# Patient Record
Sex: MALE | Race: WHITE | URBAN - METROPOLITAN AREA
[De-identification: names, ages, dates, MRNs, and addresses within clinical notes are randomized per-mention and may not be internally consistent; named-entity substitution may affect disease eponyms.]

---

## 2023-09-20 PROBLEM — R26.89 BALANCE PROBLEM: Status: ACTIVE | Noted: 2023-09-20

## 2023-09-20 PROBLEM — F51.01 PRIMARY INSOMNIA: Status: ACTIVE | Noted: 2023-09-20

## 2023-09-20 PROBLEM — R25.1 TREMOR OF BOTH HANDS: Status: ACTIVE | Noted: 2023-09-20

## 2023-09-20 PROBLEM — E78.2 MIXED HYPERLIPIDEMIA: Status: ACTIVE | Noted: 2023-09-20

## 2023-09-20 PROBLEM — S86.112D: Status: ACTIVE | Noted: 2023-09-20

## 2023-09-20 PROBLEM — R25.1 SHAKINESS: Status: ACTIVE | Noted: 2023-09-20

## 2023-09-20 PROBLEM — S76.312D HAMSTRING MUSCLE STRAIN, LEFT, SUBSEQUENT ENCOUNTER: Status: ACTIVE | Noted: 2023-09-20

## 2023-09-20 PROBLEM — I10 ESSENTIAL HYPERTENSION: Status: ACTIVE | Noted: 2023-09-20

## 2023-09-20 PROBLEM — E56.9 VITAMIN DEFICIENCY: Status: ACTIVE | Noted: 2023-09-20

## 2023-09-20 PROBLEM — G47.30 SLEEP APNEA: Status: ACTIVE | Noted: 2023-09-20

## 2023-09-20 PROBLEM — M51.9 INTERVERTEBRAL DISC DISORDER: Status: ACTIVE | Noted: 2023-09-20

## 2023-09-20 PROBLEM — F32.A DEPRESSION: Status: ACTIVE | Noted: 2023-09-20

## 2023-09-20 PROBLEM — F41.8 SITUATIONAL ANXIETY: Status: ACTIVE | Noted: 2023-09-20

## 2023-09-20 PROBLEM — S83.249A TEAR OF MEDIAL MENISCUS OF KNEE: Status: ACTIVE | Noted: 2023-09-20

## 2023-09-20 PROBLEM — M1A.0291: Status: ACTIVE | Noted: 2023-09-20

## 2023-09-20 PROBLEM — R61 SWEATING PROFUSELY: Status: ACTIVE | Noted: 2023-09-20

## 2023-09-20 PROBLEM — Z04.9 CONDITION NOT FOUND: Status: ACTIVE | Noted: 2023-09-20

## 2023-09-20 PROBLEM — R26.81 UNSTEADY GAIT: Status: ACTIVE | Noted: 2023-09-20

## 2023-09-20 PROBLEM — I73.9 CLAUDICATION (CMS-HCC): Status: ACTIVE | Noted: 2023-09-20

## 2023-09-20 PROBLEM — S83.411A SPRAIN OF MEDIAL COLLATERAL LIGAMENT OF RIGHT KNEE: Status: ACTIVE | Noted: 2023-09-20

## 2023-09-20 PROBLEM — R41.840 ATTENTION DEFICIT: Status: ACTIVE | Noted: 2023-09-20

## 2023-09-20 PROBLEM — R55 NEAR SYNCOPE: Status: ACTIVE | Noted: 2023-09-20

## 2023-09-20 RX ORDER — MULTIVITAMIN WITH IRON
1 TABLET ORAL DAILY
COMMUNITY
Start: 2022-07-01

## 2023-09-20 RX ORDER — MELOXICAM 15 MG/1
15 TABLET ORAL DAILY
COMMUNITY
Start: 2022-11-03 | End: 2024-04-17 | Stop reason: SDUPTHER

## 2023-09-20 RX ORDER — HYDROCHLOROTHIAZIDE 25 MG/1
25 TABLET ORAL DAILY
COMMUNITY
Start: 2020-10-27 | End: 2024-04-04 | Stop reason: SDUPTHER

## 2023-09-20 RX ORDER — SERTRALINE HYDROCHLORIDE 100 MG/1
2 TABLET, FILM COATED ORAL DAILY
COMMUNITY
Start: 2022-03-09 | End: 2024-04-08 | Stop reason: WASHOUT

## 2023-09-20 RX ORDER — TRAZODONE HYDROCHLORIDE 100 MG/1
100 TABLET ORAL NIGHTLY
COMMUNITY
Start: 2022-11-03 | End: 2024-04-08 | Stop reason: ALTCHOICE

## 2023-09-20 RX ORDER — SOD SULF/POT CHLORIDE/MAG SULF 1.479 G
TABLET ORAL
COMMUNITY
Start: 2022-06-28 | End: 2024-04-08 | Stop reason: WASHOUT

## 2023-09-20 RX ORDER — ATORVASTATIN CALCIUM 20 MG/1
1 TABLET, FILM COATED ORAL NIGHTLY
COMMUNITY
Start: 2022-03-18 | End: 2024-04-08 | Stop reason: WASHOUT

## 2023-09-20 RX ORDER — FLUTICASONE PROPIONATE 50 MCG
1 SPRAY, SUSPENSION (ML) NASAL DAILY
COMMUNITY
Start: 2022-06-21 | End: 2024-04-08 | Stop reason: SDUPTHER

## 2023-09-20 RX ORDER — SERTRALINE HYDROCHLORIDE 200 MG/1
1 CAPSULE ORAL NIGHTLY
COMMUNITY
Start: 2022-02-11 | End: 2024-04-08 | Stop reason: SDUPTHER

## 2023-09-20 RX ORDER — ATORVASTATIN CALCIUM 40 MG/1
40 TABLET, FILM COATED ORAL NIGHTLY
COMMUNITY
Start: 2022-03-18 | End: 2024-04-08 | Stop reason: SDUPTHER

## 2023-10-02 ENCOUNTER — PROCEDURE VISIT (OUTPATIENT)
Dept: SLEEP MEDICINE | Facility: HOSPITAL | Age: 58
End: 2023-10-02
Payer: COMMERCIAL

## 2023-10-02 DIAGNOSIS — G47.33 OBSTRUCTIVE SLEEP APNEA (ADULT) (PEDIATRIC): ICD-10-CM

## 2023-10-02 DIAGNOSIS — G47.61 PERIODIC LIMB MOVEMENT DISORDER: ICD-10-CM

## 2023-10-02 PROCEDURE — 95811 POLYSOM 6/>YRS CPAP 4/> PARM: CPT | Performed by: INTERNAL MEDICINE

## 2023-10-11 ENCOUNTER — OFFICE VISIT (OUTPATIENT)
Dept: SLEEP MEDICINE | Facility: CLINIC | Age: 58
End: 2023-10-11
Payer: COMMERCIAL

## 2023-10-11 VITALS — HEART RATE: 86 BPM | WEIGHT: 230 LBS | BODY MASS INDEX: 34.86 KG/M2 | HEIGHT: 68 IN | RESPIRATION RATE: 19 BRPM

## 2023-10-11 VITALS
HEIGHT: 68 IN | HEART RATE: 111 BPM | WEIGHT: 230 LBS | DIASTOLIC BLOOD PRESSURE: 86 MMHG | SYSTOLIC BLOOD PRESSURE: 122 MMHG | BODY MASS INDEX: 34.86 KG/M2

## 2023-10-11 DIAGNOSIS — G47.33 OBSTRUCTIVE SLEEP APNEA SYNDROME: Primary | ICD-10-CM

## 2023-10-11 PROCEDURE — 1036F TOBACCO NON-USER: CPT | Performed by: INTERNAL MEDICINE

## 2023-10-11 PROCEDURE — 3074F SYST BP LT 130 MM HG: CPT | Performed by: INTERNAL MEDICINE

## 2023-10-11 PROCEDURE — 99214 OFFICE O/P EST MOD 30 MIN: CPT | Performed by: INTERNAL MEDICINE

## 2023-10-11 PROCEDURE — 3079F DIAST BP 80-89 MM HG: CPT | Performed by: INTERNAL MEDICINE

## 2023-10-11 ASSESSMENT — SLEEP AND FATIGUE QUESTIONNAIRES
HOW LIKELY ARE YOU TO NOD OFF OR FALL ASLEEP WHILE SITTING AND READING: HIGH CHANCE OF DOZING
HOW LIKELY ARE YOU TO NOD OFF OR FALL ASLEEP WHILE LYING DOWN TO REST IN THE AFTERNOON WHEN CIRCUMSTANCES PERMIT: HIGH CHANCE OF DOZING
HOW LIKELY ARE YOU TO NOD OFF OR FALL ASLEEP WHILE SITTING AND TALKING TO SOMEONE: MODERATE CHANCE OF DOZING
HOW LIKELY ARE YOU TO NOD OFF OR FALL ASLEEP WHILE SITTING QUIETLY AFTER LUNCH WITHOUT ALCOHOL: HIGH CHANCE OF DOZING
ESS-CHAD TOTAL SCORE: 18
SITING INACTIVE IN A PUBLIC PLACE LIKE A CLASS ROOM OR A MOVIE THEATER: SLIGHT CHANCE OF DOZING
HOW LIKELY ARE YOU TO NOD OFF OR FALL ASLEEP IN A CAR, WHILE STOPPED FOR A FEW MINUTES IN TRAFFIC: WOULD NEVER DOZE
HOW LIKELY ARE YOU TO NOD OFF OR FALL ASLEEP WHILE WATCHING TV: HIGH CHANCE OF DOZING
HOW LIKELY ARE YOU TO NOD OFF OR FALL ASLEEP WHEN YOU ARE A PASSENGER IN A CAR FOR AN HOUR WITHOUT A BREAK: HIGH CHANCE OF DOZING

## 2023-10-11 ASSESSMENT — PAIN SCALES - GENERAL: PAINLEVEL: 0-NO PAIN

## 2023-10-11 NOTE — PROGRESS NOTES
Subjective   Patient ID: Krishna Pressley is a 57 y.o. male who presents for Follow-up and Review Sleep Study Results.  HPI  10/2/2023: Split-night sleep study, RDI 3% 154, RDI 4% 139.9, MAY 0.6, SPO2 nato 83%, CPAP 13 cm H2O with heated humidification and ResMed Quatro medium fullface mask.  PLMI 21.7    Review of Systems   All other systems reviewed and are negative.      Objective   Physical Exam  PHYSICAL EXAM: GENERAL: alert pleasant and cooperative no acute distress  nasal mucosa , normal, and high arched palate narrow  MODIFIED MONTANO SCORE: IV  MODIFIED MALLAMPATI SCORE: IV (only hard palate visible)  edematous  1+  TONGUE SCALLOPING: enlarged     Assessment/Plan   Problem List Items Addressed This Visit             ICD-10-CM    Obstructive sleep apnea syndrome - Primary G47.33     OBSTRUCTIVE SLEEP APNEA:  - The patient has sleep apnea and requires treatment.  - Start  CPAP 13 cm H20 through [employer].  - Sleep apnea and PAP therapy education was provided at length in clinic today. Krishna  verbalized understanding.  - Diet, exercise, and weight loss were emphasized today in clinic, as were non-supine sleep, avoiding alcohol in the late evening, and driving or operating heavy machinery when sleepy.   -Krishna verbalized understanding.          Relevant Orders    Positive Airway Pressure (PAP) Therapy

## 2023-10-11 NOTE — ASSESSMENT & PLAN NOTE
OBSTRUCTIVE SLEEP APNEA:  - The patient has sleep apnea and requires treatment.  - Start  CPAP 13 cm H20 through [facility]  - Sleep apnea and PAP therapy education was provided at length in clinic today. Krishna  verbalized understanding.  - Diet, exercise, and weight loss were emphasized today in clinic, as were non-supine sleep, avoiding alcohol in the late evening, and driving or operating heavy machinery when sleepy.   -Krishna verbalized understanding.

## 2023-10-11 NOTE — PATIENT INSTRUCTIONS
"  Starting Positive Airway Pressure: You were ordered a device to wear when you sleep called PAP (Positive Airway Pressure) to treat your sleep apnea. The order will be submitted to a durable medical equipment company who will arrange setting you up with the device. They will provide all the necessary equipment and discuss use and maintenance of the device with you.     Please followup with us in 1-2 months of starting PAP to see how well it is working for you or to troubleshoot. Please bring your equipment to this initial followup visit.    **Please bring all PAP equipment with you to follow up appointments unless told otherwise.**     Important things to keep in mind as you start PAP:  Insurance will monitor your usage during the first 90 days.  You should use your PAP - \"all night, every night\", for your health. The bare minimum is to use your PAP device while sleeping = at least 4 hours per day at least 5 days per week. Otherwise, your PAP device may be reclaimed by your PAP vendor at 90 days.  There are many mask to choose from to wear with your PAP machine. If you are not comfortable with the first mask issued to you, call your DME and ask for another option to try. Some have a 30 day return policy.  Discuss with your provider if you are having issues breathing with the machine or the temperature or humidity feel uncomfortable  Expect to have an adjustment period when you start your device. It helps to continuing wearing the machine every day for a period of time until you get more used to it. You can practice with wearing the mask alone if you need, then add in the PAP air pressure a few days later.   Reach out for help if you are struggling! The sleep medicine department can be reached at [phone]  We encourage you to download data monitoring apps to your phone. For [employer] 10/11 - MyAir isai. For POI - DreamMapper. Both are available in the Isai store for free and are a great " tool to monitor your progress with your CPAP night to night.

## 2023-10-12 NOTE — PROGRESS NOTES
[facility] TECH NOTE:     Patient: Krishna Pressley   MRN//AGE: [id number]  1965  57 y.o.   Technologist: Bk Buckley   Room: 1   Service Date: 10/11/2023        Sleep Testing Location: [facility]: 13    TECHNOLOGIST SLEEP STUDY PROCEDURE NOTE:   This sleep study is being conducted according to the policies and procedures outlined by the AAS accreditation standards.  The sleep study procedure and processes involved during this appointment was explained to the patient/patient’s family, questions were answered. The patient/family verbalized understanding.      The patient is a 57 y.o. year old male scheduled for aDiagnostic PSG Split night with montage of: PSG  He arrived for his appointment.      The study that was ultimately completed was a Diagnostic PSG Split night with montage of: PSG    The full study was completed.  Patient questionnaires completed?: yes     Consents signed? yes    Initial Fall Risk Screening:     Krishna has not fallen in the last 6 months. His did not result in injury. Krishna does not have a fear of falling. He does not need assistance with sitting, standing, or walking. He does not need assistance walking in his home. He does not need assistance in an unfamiliar setting. The patient is notusing an assistive device.     Brief Study observations: The patient was referred for a split night study. Respiratory events, desaturations and arousals were observed. After two hours of sleep, the patient qualified to be split. The final pressure was 13cm       Deviation to order/protocol and reason: Split night       If PAP, which was preferred mask/pressure/mode: Res Med Quattro medium full face mask       Other:None    After the procedure, the patient/family was informed to ensure followup with ordering clinician for testing results.      Technologist: Bk Buckley [facility]

## 2023-12-26 ENCOUNTER — TELEPHONE (OUTPATIENT)
Dept: RHEUMATOLOGY | Facility: CLINIC | Age: 58
End: 2023-12-26
Payer: COMMERCIAL

## 2023-12-26 DIAGNOSIS — M45.9 ANKYLOSING SPONDYLITIS, UNSPECIFIED SITE OF SPINE (MULTI): ICD-10-CM

## 2023-12-26 DIAGNOSIS — R76.8 POSITIVE ANA (ANTINUCLEAR ANTIBODY): ICD-10-CM

## 2023-12-26 DIAGNOSIS — E79.0 HYPERURICEMIA: ICD-10-CM

## 2023-12-26 DIAGNOSIS — D69.3 CHRONIC IDIOPATHIC THROMBOCYTOPENIC PURPURA (MULTI): ICD-10-CM

## 2023-12-26 DIAGNOSIS — M06.09 RHEUMATOID ARTHRITIS OF MULTIPLE SITES WITHOUT RHEUMATOID FACTOR (MULTI): ICD-10-CM

## 2023-12-26 DIAGNOSIS — M06.09 POLYARTHRITIS WITH NEGATIVE RHEUMATOID FACTOR (MULTI): ICD-10-CM

## 2023-12-26 DIAGNOSIS — E56.9 VITAMIN DEFICIENCY: ICD-10-CM

## 2023-12-26 DIAGNOSIS — Z11.59 ENCOUNTER FOR SCREENING FOR VIRAL DISEASE: ICD-10-CM

## 2024-01-10 ENCOUNTER — APPOINTMENT (OUTPATIENT)
Dept: RHEUMATOLOGY | Facility: CLINIC | Age: 59
End: 2024-01-10
Payer: COMMERCIAL

## 2024-03-13 ENCOUNTER — APPOINTMENT (OUTPATIENT)
Dept: RHEUMATOLOGY | Facility: CLINIC | Age: 59
End: 2024-03-13
Payer: COMMERCIAL

## 2024-04-04 DIAGNOSIS — I10 ESSENTIAL HYPERTENSION: ICD-10-CM

## 2024-04-04 RX ORDER — HYDROCHLOROTHIAZIDE 25 MG/1
25 TABLET ORAL DAILY
Qty: 30 TABLET | Refills: 0 | Status: SHIPPED | OUTPATIENT
Start: 2024-04-04 | End: 2024-04-08 | Stop reason: SDUPTHER

## 2024-04-08 ENCOUNTER — OFFICE VISIT (OUTPATIENT)
Dept: PRIMARY CARE | Facility: CLINIC | Age: 59
End: 2024-04-08
Payer: COMMERCIAL

## 2024-04-08 ENCOUNTER — LAB (OUTPATIENT)
Dept: LAB | Facility: LAB | Age: 59
End: 2024-04-08
Payer: COMMERCIAL

## 2024-04-08 VITALS
TEMPERATURE: 97.4 F | SYSTOLIC BLOOD PRESSURE: 132 MMHG | HEIGHT: 68 IN | WEIGHT: 237 LBS | HEART RATE: 88 BPM | DIASTOLIC BLOOD PRESSURE: 90 MMHG | BODY MASS INDEX: 35.92 KG/M2 | RESPIRATION RATE: 18 BRPM | OXYGEN SATURATION: 96 %

## 2024-04-08 DIAGNOSIS — F41.9 ANXIETY: ICD-10-CM

## 2024-04-08 DIAGNOSIS — Z00.00 ANNUAL PHYSICAL EXAM: Primary | ICD-10-CM

## 2024-04-08 DIAGNOSIS — M62.08 DIASTASIS RECTI: ICD-10-CM

## 2024-04-08 DIAGNOSIS — J30.2 SEASONAL ALLERGIES: ICD-10-CM

## 2024-04-08 DIAGNOSIS — I73.9 CLAUDICATION (CMS-HCC): ICD-10-CM

## 2024-04-08 DIAGNOSIS — Z12.5 SCREENING PSA (PROSTATE SPECIFIC ANTIGEN): ICD-10-CM

## 2024-04-08 DIAGNOSIS — Z00.00 ANNUAL PHYSICAL EXAM: ICD-10-CM

## 2024-04-08 DIAGNOSIS — I10 ESSENTIAL HYPERTENSION: ICD-10-CM

## 2024-04-08 LAB
ALBUMIN SERPL-MCNC: 4.5 G/DL (ref 3.5–5)
ALP BLD-CCNC: 98 U/L (ref 35–125)
ALT SERPL-CCNC: 30 U/L (ref 5–40)
ANION GAP SERPL CALC-SCNC: 13 MMOL/L
AST SERPL-CCNC: 20 U/L (ref 5–40)
BILIRUB SERPL-MCNC: 1 MG/DL (ref 0.1–1.2)
BUN SERPL-MCNC: 18 MG/DL (ref 8–25)
CALCIUM SERPL-MCNC: 9.7 MG/DL (ref 8.5–10.4)
CHLORIDE SERPL-SCNC: 102 MMOL/L (ref 97–107)
CHOLEST SERPL-MCNC: 247 MG/DL (ref 133–200)
CHOLEST/HDLC SERPL: 4.5 {RATIO}
CO2 SERPL-SCNC: 23 MMOL/L (ref 24–31)
CREAT SERPL-MCNC: 0.9 MG/DL (ref 0.4–1.6)
EGFRCR SERPLBLD CKD-EPI 2021: >90 ML/MIN/1.73M*2
GLUCOSE SERPL-MCNC: 98 MG/DL (ref 65–99)
HDLC SERPL-MCNC: 55 MG/DL
LDLC SERPL CALC-MCNC: 150 MG/DL (ref 65–130)
POTASSIUM SERPL-SCNC: 4.2 MMOL/L (ref 3.4–5.1)
PROT SERPL-MCNC: 6.9 G/DL (ref 5.9–7.9)
PSA SERPL-MCNC: 0.7 NG/ML
SODIUM SERPL-SCNC: 138 MMOL/L (ref 133–145)
TRIGL SERPL-MCNC: 210 MG/DL (ref 40–150)

## 2024-04-08 PROCEDURE — 1036F TOBACCO NON-USER: CPT | Performed by: NURSE PRACTITIONER

## 2024-04-08 PROCEDURE — 3075F SYST BP GE 130 - 139MM HG: CPT | Performed by: NURSE PRACTITIONER

## 2024-04-08 PROCEDURE — 84153 ASSAY OF PSA TOTAL: CPT

## 2024-04-08 PROCEDURE — 80053 COMPREHEN METABOLIC PANEL: CPT

## 2024-04-08 PROCEDURE — 80061 LIPID PANEL: CPT

## 2024-04-08 PROCEDURE — 36415 COLL VENOUS BLD VENIPUNCTURE: CPT

## 2024-04-08 PROCEDURE — 3080F DIAST BP >= 90 MM HG: CPT | Performed by: NURSE PRACTITIONER

## 2024-04-08 PROCEDURE — 99396 PREV VISIT EST AGE 40-64: CPT | Performed by: NURSE PRACTITIONER

## 2024-04-08 RX ORDER — FLUTICASONE PROPIONATE 50 MCG
1 SPRAY, SUSPENSION (ML) NASAL DAILY
Qty: 16 G | Refills: 5 | Status: SHIPPED | OUTPATIENT
Start: 2024-04-08

## 2024-04-08 RX ORDER — SERTRALINE HYDROCHLORIDE 200 MG/1
1 CAPSULE ORAL NIGHTLY
Qty: 90 CAPSULE | Refills: 1 | Status: SHIPPED | OUTPATIENT
Start: 2024-04-08 | End: 2024-04-11

## 2024-04-08 RX ORDER — FLUTICASONE PROPIONATE 50 MCG
SPRAY, SUSPENSION (ML) NASAL
Qty: 48 G | OUTPATIENT
Start: 2024-04-08

## 2024-04-08 RX ORDER — ATORVASTATIN CALCIUM 40 MG/1
40 TABLET, FILM COATED ORAL NIGHTLY
Qty: 90 TABLET | Refills: 1 | Status: SHIPPED | OUTPATIENT
Start: 2024-04-08 | End: 2024-10-05

## 2024-04-08 RX ORDER — HYDROCHLOROTHIAZIDE 25 MG/1
25 TABLET ORAL DAILY
Qty: 90 TABLET | OUTPATIENT
Start: 2024-04-08

## 2024-04-08 RX ORDER — HYDROCHLOROTHIAZIDE 25 MG/1
25 TABLET ORAL DAILY
Qty: 30 TABLET | Refills: 0 | Status: SHIPPED | OUTPATIENT
Start: 2024-04-08 | End: 2024-04-08 | Stop reason: SDUPTHER

## 2024-04-08 RX ORDER — HYDROCHLOROTHIAZIDE 25 MG/1
25 TABLET ORAL DAILY
Qty: 90 TABLET | Refills: 1 | Status: SHIPPED | OUTPATIENT
Start: 2024-04-08 | End: 2024-10-05

## 2024-04-08 ASSESSMENT — PATIENT HEALTH QUESTIONNAIRE - PHQ9
2. FEELING DOWN, DEPRESSED OR HOPELESS: NOT AT ALL
1. LITTLE INTEREST OR PLEASURE IN DOING THINGS: NOT AT ALL
SUM OF ALL RESPONSES TO PHQ9 QUESTIONS 1 AND 2: 0

## 2024-04-08 ASSESSMENT — ENCOUNTER SYMPTOMS
NAUSEA: 0
DIZZINESS: 0
FEVER: 0
WHEEZING: 0
LIGHT-HEADEDNESS: 0
HEMATOLOGIC/LYMPHATIC NEGATIVE: 1
WEAKNESS: 0
MUSCULOSKELETAL NEGATIVE: 1
COUGH: 0
NUMBNESS: 0
VOMITING: 0
SHORTNESS OF BREATH: 0
PSYCHIATRIC NEGATIVE: 1
DIAPHORESIS: 0
CONSTIPATION: 0
PALPITATIONS: 0
ALLERGIC/IMMUNOLOGIC NEGATIVE: 1
FATIGUE: 0
DIARRHEA: 0

## 2024-04-08 ASSESSMENT — PAIN SCALES - GENERAL: PAINLEVEL: 7

## 2024-04-08 NOTE — PROGRESS NOTES
"History Of Present Illness  Krishna Pressley is a 58 y.o. male presenting for \"Annual Exam (States he had a colonoscopy a year ago).\"    HPI 58 year old male here for a physical and labs.  He is using a CPAP works well for him wearing eight hours a day.  Needs refill on flonase.      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    Intervertebral disc disorder 09/20/2023    Mixed hyperlipidemia 09/20/2023    Near syncope 09/20/2023    Primary insomnia 09/20/2023    Shakiness 09/20/2023    Obstructive sleep apnea syndrome 09/20/2023    Sprain of medial collateral ligament of right knee 09/20/2023    Tear of medial meniscus of knee 09/20/2023    Sweating profusely 09/20/2023    Tremor of both hands 09/20/2023    Unsteady gait 09/20/2023    Vitamin deficiency 09/20/2023    Condition not found 09/20/2023        Medications  Current Outpatient Medications   Medication Instructions    atorvastatin (LIPITOR) 40 mg, oral, Nightly    fluticasone (Flonase) 50 mcg/actuation nasal spray 1 spray, Each Nostril, Daily    hydroCHLOROthiazide (HYDRODIURIL) 25 mg, oral, Daily    meloxicam (MOBIC) 15 mg, oral, Daily    multivitamin (Multiple Vitamins) tablet 1 tablet, oral, Daily    sertraline 200 mg capsule 1 capsule, oral, Nightly        Surgical History  He has a past surgical history that includes Vasectomy (02/11/2014).     Social History  He reports that he has quit smoking. His smoking use included cigarettes. He has been exposed to tobacco smoke. He has quit using smokeless tobacco. He reports that he does not " currently use alcohol. He reports that he does not use drugs.    Family History  Family History   Problem Relation Name Age of Onset    Other (non hodgkins lymphoma) Mother      Hypertension Mother      Cancer Father      Hypertension Father          Allergies  Other, Meperidine, Oxycodone-acetaminophen, and Pentazocine    ROS  Review of Systems   Constitutional:  Negative for diaphoresis, fatigue and fever.   HENT: Negative.     Respiratory:  Negative for cough, shortness of breath and wheezing.    Cardiovascular:  Negative for chest pain and palpitations.   Gastrointestinal:  Negative for constipation, diarrhea, nausea and vomiting.   Genitourinary: Negative.    Musculoskeletal: Negative.    Skin: Negative.    Allergic/Immunologic: Negative.    Neurological:  Negative for dizziness, weakness, light-headedness and numbness.   Hematological: Negative.    Psychiatric/Behavioral: Negative.          Last Recorded Vitals  /90 (BP Location: Right arm, Patient Position: Sitting, BP Cuff Size: Adult)   Pulse 88   Temp 36.3 °C (97.4 °F)   Resp 18   Wt 108 kg (237 lb)   SpO2 96%     Physical Exam  Vitals and nursing note reviewed.   Constitutional:       Appearance: Normal appearance.   HENT:      Head: Normocephalic and atraumatic.      Right Ear: Tympanic membrane, ear canal and external ear normal.      Left Ear: Tympanic membrane, ear canal and external ear normal.      Nose: Nose normal.      Mouth/Throat:      Mouth: Mucous membranes are moist.      Pharynx: Oropharynx is clear.   Eyes:      Extraocular Movements: Extraocular movements intact.      Conjunctiva/sclera: Conjunctivae normal.      Pupils: Pupils are equal, round, and reactive to light.   Neck:      Thyroid: No thyromegaly.   Cardiovascular:      Rate and Rhythm: Normal rate and regular rhythm.      Pulses: Normal pulses.      Heart sounds: Normal heart sounds, S1 normal and S2 normal.   Pulmonary:      Effort: Pulmonary effort is normal.       Breath sounds: Normal breath sounds. No wheezing or rhonchi.   Abdominal:      General: Bowel sounds are normal.      Palpations: Abdomen is soft. There is no mass.      Tenderness: There is no abdominal tenderness. There is no guarding.   Genitourinary:     Comments: Not examined  Musculoskeletal:         General: Normal range of motion.      Cervical back: Normal range of motion.      Right lower leg: No edema.      Left lower leg: No edema.   Lymphadenopathy:      Cervical: No cervical adenopathy.   Skin:     General: Skin is warm and dry.      Capillary Refill: Capillary refill takes less than 2 seconds.      Findings: No rash.   Neurological:      General: No focal deficit present.      Mental Status: He is alert and oriented to person, place, and time. Mental status is at baseline.      Cranial Nerves: Cranial nerves 2-12 are intact. No cranial nerve deficit.      Sensory: Sensation is intact.      Motor: Motor function is intact.      Coordination: Coordination is intact.      Gait: Gait is intact.   Psychiatric:         Mood and Affect: Mood normal.         Behavior: Behavior normal.         Thought Content: Thought content normal.         Judgment: Judgment normal.         Relevant Results      Assessment/Plan   Krishna was seen today for annual exam.  Diagnoses and all orders for this visit:  Annual physical exam (Primary)  -     Comprehensive Metabolic Panel; Future  -     Lipid Panel; Future  Diastasis recti  -     Referral to General Surgery; Future  Screening PSA (prostate specific antigen)  -     Prostate Specific Antigen; Future  Claudication (CMS/HCC)  Comments:  stable  Essential hypertension  -     Discontinue: hydroCHLOROthiazide (HYDRODiuril) 25 mg tablet; Take 1 tablet (25 mg) by mouth once daily.  -     atorvastatin (Lipitor) 40 mg tablet; Take 1 tablet (40 mg) by mouth once daily at bedtime.  -     hydroCHLOROthiazide (HYDRODiuril) 25 mg tablet; Take 1 tablet (25 mg) by mouth once  daily.  Anxiety  -     sertraline 200 mg capsule; Take 1 capsule by mouth once daily at bedtime.  Seasonal allergies  -     fluticasone (Flonase) 50 mcg/actuation nasal spray; Administer 1 spray into each nostril once daily.          COUNSELING      Medication education:              Education:  The patient is counseled regarding potential side-effects of any and all new medications             Understanding:  Patient expressed understanding             Adherence:  No barriers to adherence identified        Ashley Bahena, APRN-CNP

## 2024-04-09 PROBLEM — R41.840 IMPAIRED CONCENTRATION: Status: ACTIVE | Noted: 2022-06-30

## 2024-04-09 PROBLEM — M25.569 KNEE PAIN: Status: ACTIVE | Noted: 2024-04-09

## 2024-04-09 PROBLEM — M45.9 ANKYLOSING SPONDYLITIS (MULTI): Status: ACTIVE | Noted: 2024-04-09

## 2024-04-09 PROBLEM — G47.61 PERIODIC LIMB MOVEMENT DISORDER: Status: ACTIVE | Noted: 2024-04-09

## 2024-04-09 PROBLEM — U09.9 POST COVID-19 CONDITION, UNSPECIFIED: Status: ACTIVE | Noted: 2024-04-09

## 2024-04-09 PROBLEM — M54.12 CERVICAL RADICULOPATHY: Status: ACTIVE | Noted: 2024-04-09

## 2024-04-09 PROBLEM — R61 EXCESSIVE SWEATING: Status: ACTIVE | Noted: 2022-03-23

## 2024-04-09 PROBLEM — E55.9 VITAMIN D DEFICIENCY: Status: ACTIVE | Noted: 2023-09-20

## 2024-04-09 PROBLEM — R29.6 RECURRENT FALLS: Status: ACTIVE | Noted: 2022-03-23

## 2024-04-09 PROBLEM — M62.08 DIASTASIS OF RECTUS ABDOMINIS: Status: ACTIVE | Noted: 2024-04-09

## 2024-04-09 PROBLEM — R69 DISORDER: Status: ACTIVE | Noted: 2024-04-09

## 2024-04-09 PROBLEM — M25.59 PAIN IN OTHER SPECIFIED JOINT: Status: ACTIVE | Noted: 2024-04-09

## 2024-04-09 PROBLEM — M54.2 NECK PAIN: Status: ACTIVE | Noted: 2024-04-09

## 2024-04-09 PROBLEM — R26.9 ABNORMAL GAIT: Status: ACTIVE | Noted: 2022-03-23

## 2024-04-09 PROBLEM — G47.30 SLEEP APNEA: Status: ACTIVE | Noted: 2024-04-09

## 2024-04-09 PROBLEM — R19.7 DIARRHEA, UNSPECIFIED: Status: ACTIVE | Noted: 2022-07-11

## 2024-04-09 PROBLEM — R05.9 COUGH: Status: ACTIVE | Noted: 2021-12-29

## 2024-04-09 PROBLEM — I10 PRIMARY HYPERTENSION: Status: ACTIVE | Noted: 2022-11-03

## 2024-04-09 PROBLEM — J30.2 SEASONAL ALLERGIES: Status: ACTIVE | Noted: 2024-04-09

## 2024-04-09 PROBLEM — M10.9 GOUTY ARTHRITIS OF TOE: Status: ACTIVE | Noted: 2023-09-20

## 2024-04-10 ENCOUNTER — OFFICE VISIT (OUTPATIENT)
Dept: SURGERY | Facility: CLINIC | Age: 59
End: 2024-04-10
Payer: COMMERCIAL

## 2024-04-10 VITALS
HEIGHT: 68 IN | TEMPERATURE: 97.4 F | OXYGEN SATURATION: 95 % | SYSTOLIC BLOOD PRESSURE: 120 MMHG | HEART RATE: 84 BPM | WEIGHT: 240 LBS | BODY MASS INDEX: 36.37 KG/M2 | DIASTOLIC BLOOD PRESSURE: 84 MMHG

## 2024-04-10 DIAGNOSIS — M62.08 DIASTASIS RECTI: ICD-10-CM

## 2024-04-10 DIAGNOSIS — K43.2 RECURRENT VENTRAL HERNIA: Primary | ICD-10-CM

## 2024-04-10 PROCEDURE — 1036F TOBACCO NON-USER: CPT | Performed by: SURGERY

## 2024-04-10 PROCEDURE — 99213 OFFICE O/P EST LOW 20 MIN: CPT | Performed by: SURGERY

## 2024-04-10 PROCEDURE — 3079F DIAST BP 80-89 MM HG: CPT | Performed by: SURGERY

## 2024-04-10 PROCEDURE — 3074F SYST BP LT 130 MM HG: CPT | Performed by: SURGERY

## 2024-04-10 PROCEDURE — 99203 OFFICE O/P NEW LOW 30 MIN: CPT | Performed by: SURGERY

## 2024-04-10 ASSESSMENT — ENCOUNTER SYMPTOMS
BRUISES/BLEEDS EASILY: 0
WOUND: 0
CONSTIPATION: 0
ABDOMINAL PAIN: 0
FEVER: 0
BLOOD IN STOOL: 0
SHORTNESS OF BREATH: 0
NECK PAIN: 0
TROUBLE SWALLOWING: 0
CHEST TIGHTNESS: 0
NAUSEA: 0
WEAKNESS: 0
FACIAL SWELLING: 0
SPEECH DIFFICULTY: 0
COLOR CHANGE: 0
NERVOUS/ANXIOUS: 0
BACK PAIN: 0
DIFFICULTY URINATING: 0
CHILLS: 0
VOMITING: 0
SORE THROAT: 0
LIGHT-HEADEDNESS: 0
ABDOMINAL DISTENTION: 0
ADENOPATHY: 0
EYE REDNESS: 0
DIARRHEA: 0
CONFUSION: 0

## 2024-04-10 ASSESSMENT — PATIENT HEALTH QUESTIONNAIRE - PHQ9
1. LITTLE INTEREST OR PLEASURE IN DOING THINGS: NOT AT ALL
SUM OF ALL RESPONSES TO PHQ9 QUESTIONS 1 AND 2: 0
2. FEELING DOWN, DEPRESSED OR HOPELESS: NOT AT ALL

## 2024-04-10 ASSESSMENT — PAIN SCALES - GENERAL: PAINLEVEL: 0-NO PAIN

## 2024-04-10 NOTE — PROGRESS NOTES
General Surgery Service    History Of Present Illness    Krishna Pressley is a 58 y.o. male presenting with an asymptomatic bulge above his umbilicus.  He had a previous open hernia repair in 2015.  He had gained weight since then, and is now working on losing weight.  He is lost 12-15 pounds intentionally.  He denies obstructive symptoms.  He denies pain.    He has a diagnosis of ankylosing spondylitis on his past medical history, but he denies having any back pain.  He rides a motorcycle without difficulty.     Past Medical History  He has a past medical history of Anxiety and High blood pressure.    Current Outpatient Medications on File Prior to Visit   Medication Sig Dispense Refill    atorvastatin (Lipitor) 40 mg tablet Take 1 tablet (40 mg) by mouth once daily at bedtime. 90 tablet 1    fluticasone (Flonase) 50 mcg/actuation nasal spray Administer 1 spray into each nostril once daily. 16 g 5    hydroCHLOROthiazide (HYDRODiuril) 25 mg tablet Take 1 tablet (25 mg) by mouth once daily. 90 tablet 1    meloxicam (Mobic) 15 mg tablet Take 1 tablet (15 mg) by mouth once daily.      multivitamin (Multiple Vitamins) tablet Take 1 tablet by mouth once daily.      sertraline 200 mg capsule Take 1 capsule by mouth once daily at bedtime. 90 capsule 1    [DISCONTINUED] atorvastatin (Lipitor) 20 mg tablet Take 1 tablet (20 mg) by mouth once daily at bedtime.      [DISCONTINUED] atorvastatin (Lipitor) 40 mg tablet Take 1 tablet (40 mg) by mouth once daily at bedtime.      [DISCONTINUED] fluticasone (Flonase) 50 mcg/actuation nasal spray Administer 1 spray into each nostril once daily.      [DISCONTINUED] hydroCHLOROthiazide (HYDRODiuril) 25 mg tablet Take 1 tablet (25 mg) by mouth once daily.      [DISCONTINUED] hydroCHLOROthiazide (HYDRODiuril) 25 mg tablet Take 1 tablet (25 mg) by mouth once daily. 30 tablet 0    [DISCONTINUED] hydroCHLOROthiazide (HYDRODiuril) 25 mg tablet Take 1 tablet (25 mg) by mouth once daily. 30  tablet 0    [DISCONTINUED] sertraline (Zoloft) 100 mg tablet Take 2 tablets (200 mg) by mouth once daily.      [DISCONTINUED] sertraline 200 mg capsule Take 1 capsule by mouth once daily at bedtime.      [DISCONTINUED] sod sulf-pot chloride-mag sulf (Sutab) 1.479-0.188- 0.225 gram tablet Sutab 4745-156-128 MG Oral Tablet   Quantity: 24  Refills: 0        Start : 28-Jun-2022   Active      [DISCONTINUED] traZODone (Desyrel) 100 mg tablet Take 1 tablet (100 mg) by mouth once daily at bedtime.       No current facility-administered medications on file prior to visit.         Surgical History  He has a past surgical history that includes Vasectomy (02/11/2014).     Social History  He reports that he has quit smoking. His smoking use included cigarettes. He has been exposed to tobacco smoke. He has quit using smokeless tobacco. He reports that he does not currently use alcohol. He reports that he does not use drugs.    Family History  Family History   Problem Relation Name Age of Onset    Other (non hodgkins lymphoma) Mother      Hypertension Mother      Cancer Father      Hypertension Father          Allergies  Other, Meperidine, Oxycodone-acetaminophen, and Pentazocine    Review of Systems   Constitutional:  Negative for chills and fever.   HENT:  Negative for facial swelling, sore throat and trouble swallowing.    Eyes:  Negative for redness and visual disturbance.   Respiratory:  Negative for chest tightness and shortness of breath.    Cardiovascular:  Negative for chest pain and leg swelling.   Gastrointestinal:  Negative for abdominal distention, abdominal pain, blood in stool, constipation, diarrhea, nausea and vomiting.   Endocrine: Negative for cold intolerance and heat intolerance.   Genitourinary:  Negative for difficulty urinating and enuresis.   Musculoskeletal:  Negative for back pain, gait problem and neck pain.   Skin:  Negative for color change, rash and wound.   Allergic/Immunologic: Negative for  immunocompromised state.   Neurological:  Negative for speech difficulty, weakness and light-headedness.   Hematological:  Negative for adenopathy. Does not bruise/bleed easily.   Psychiatric/Behavioral:  Negative for confusion. The patient is not nervous/anxious.         Physical Exam  Constitutional:       General: He is not in acute distress.     Appearance: He is not toxic-appearing.   HENT:      Head: Normocephalic and atraumatic.      Mouth/Throat:      Mouth: Mucous membranes are moist.      Pharynx: Oropharynx is clear.   Eyes:      General: No scleral icterus.     Extraocular Movements: Extraocular movements intact.      Pupils: Pupils are equal, round, and reactive to light.   Cardiovascular:      Rate and Rhythm: Normal rate and regular rhythm.   Pulmonary:      Effort: Pulmonary effort is normal.      Breath sounds: Normal breath sounds.   Abdominal:      General: There is no distension.      Palpations: Abdomen is soft. There is no mass.      Tenderness: There is no abdominal tenderness. There is no guarding.      Hernia: No hernia (Diastases recti approximately 6 cm wide extending from the umbilicus to the xiphoid.  He has a small recurrent supraumbilical ventral hernia measuring about 2 cm in size.  Reducible, nontender.) is present.   Musculoskeletal:         General: No swelling. Normal range of motion.      Cervical back: Normal range of motion.   Skin:     General: Skin is warm and dry.      Coloration: Skin is not jaundiced.   Neurological:      General: No focal deficit present.      Mental Status: He is alert and oriented to person, place, and time.   Psychiatric:         Mood and Affect: Mood normal.         Behavior: Behavior normal.          Last Recorded Vitals  /84 (BP Location: Left arm, Patient Position: Sitting)   Pulse 84   Temp 36.3 °C (97.4 °F)   Wt 109 kg (240 lb)   SpO2 95%     Relevant Results      No results found for this or any previous visit (from the past 24  hour(s)).   Lab Results   Component Value Date    HGBA1C 5.2 02/21/2018      No results found.    Active Problems:  There are no active Hospital Problems.     Assessment/Plan   Problem List Items Addressed This Visit             ICD-10-CM    Diastasis recti M62.08     Explained to the patient the diastases by itself is a cosmetic condition and that it does not carry risk of incarceration or obstruction.  Gave the patient a list of exercises to perform in conjunction with weight loss.  He states that he stopped drinking and is cutting carbs and is already lost about 15 pounds.  Encouraged continued weight loss and exercises to close down the diastases prior to consideration of repeat hernia repair.         Recurrent ventral hernia - Primary K43.2     Patient has a small recurrence on the superior aspect of the previous hernia repair arising within diastases recti.  His BMI is 36, which she says he has been losing weight.  Risks, benefits, and alternatives to hernia repair were discussed. Risks of bleeding, infection, mesh complications, chronic pain, potential need for bowel resection or fistulization,defunctionalization of the abdominal wall, skin necrosis, and recurrence were all discussed. Optimization of weight management and other risk factors for these complications was discussed. Benefits of laparoscopic and open approaches were discussed. We discussed the importance of mesh, in that it can reduce recurrence significantly compared to non-mesh repairs.  We discussed the option of non-operative management and the overall potential risk of incarceration.  The patient agrees to proceed with observation and continued weight loss.                   Thomas Garcia MD

## 2024-04-10 NOTE — ASSESSMENT & PLAN NOTE
Explained to the patient the diastases by itself is a cosmetic condition and that it does not carry risk of incarceration or obstruction.  Gave the patient a list of exercises to perform in conjunction with weight loss.  He states that he stopped drinking and is cutting carbs and is already lost about 15 pounds.  Encouraged continued weight loss and exercises to close down the diastases prior to consideration of repeat hernia repair.  
Patient has a small recurrence on the superior aspect of the previous hernia repair arising within diastases recti.  His BMI is 36, which she says he has been losing weight.  Risks, benefits, and alternatives to hernia repair were discussed. Risks of bleeding, infection, mesh complications, chronic pain, potential need for bowel resection or fistulization,defunctionalization of the abdominal wall, skin necrosis, and recurrence were all discussed. Optimization of weight management and other risk factors for these complications was discussed. Benefits of laparoscopic and open approaches were discussed. We discussed the importance of mesh, in that it can reduce recurrence significantly compared to non-mesh repairs.  We discussed the option of non-operative management and the overall potential risk of incarceration.  The patient agrees to proceed with observation and continued weight loss.  
Robotic assisted laparoscopic gastric bypass

## 2024-04-11 DIAGNOSIS — F41.8 SITUATIONAL ANXIETY: Primary | ICD-10-CM

## 2024-04-11 DIAGNOSIS — Z76.0 MEDICATION REFILL: ICD-10-CM

## 2024-04-11 RX ORDER — SERTRALINE HYDROCHLORIDE 100 MG/1
200 TABLET, FILM COATED ORAL DAILY
Qty: 180 TABLET | Refills: 1 | Status: SHIPPED | OUTPATIENT
Start: 2024-04-11 | End: 2024-10-08

## 2024-04-11 NOTE — TELEPHONE ENCOUNTER
Rx request received  Pharmacy populated  Last appmt 4/08/24 AND HAS UPCOMING APPMT 10/08/24.     MIKE LINDQUIST DID NOT POPULATE THIS SINCE THE RX CHANGE IS REQUESTING A DIFFERENT FORM

## 2024-04-11 NOTE — TELEPHONE ENCOUNTER
CARYL BUENROSTRO REQUESTING IF SERTRALINE 100 MG TABLETS TO TAKE 2 TO EQUAL SAME DOSE SENT  [facility] [employer]SUL[facility] AND COP[facility] IS $225 IF THEY DISPENSE THE CAPSULES REQUESTING CHANGE

## 2024-04-17 RX ORDER — MELOXICAM 15 MG/1
15 TABLET ORAL DAILY
Qty: 90 TABLET | Refills: 1 | Status: SHIPPED | OUTPATIENT
Start: 2024-04-17

## 2024-10-08 ENCOUNTER — OFFICE VISIT (OUTPATIENT)
Dept: PRIMARY CARE | Facility: CLINIC | Age: 59
End: 2024-10-08
Payer: COMMERCIAL

## 2024-10-08 ENCOUNTER — TELEPHONE (OUTPATIENT)
Dept: SLEEP MEDICINE | Facility: CLINIC | Age: 59
End: 2024-10-08

## 2024-10-08 VITALS
HEIGHT: 68 IN | TEMPERATURE: 96.5 F | RESPIRATION RATE: 18 BRPM | WEIGHT: 229 LBS | HEART RATE: 62 BPM | BODY MASS INDEX: 34.71 KG/M2 | DIASTOLIC BLOOD PRESSURE: 84 MMHG | OXYGEN SATURATION: 97 % | SYSTOLIC BLOOD PRESSURE: 142 MMHG

## 2024-10-08 DIAGNOSIS — M45.9 ANKYLOSING SPONDYLITIS, UNSPECIFIED SITE OF SPINE (MULTI): ICD-10-CM

## 2024-10-08 DIAGNOSIS — E78.2 MIXED HYPERLIPIDEMIA: ICD-10-CM

## 2024-10-08 DIAGNOSIS — Z23 ENCOUNTER FOR IMMUNIZATION: ICD-10-CM

## 2024-10-08 DIAGNOSIS — I10 ESSENTIAL HYPERTENSION: Primary | ICD-10-CM

## 2024-10-08 DIAGNOSIS — F41.8 SITUATIONAL ANXIETY: ICD-10-CM

## 2024-10-08 PROCEDURE — 3079F DIAST BP 80-89 MM HG: CPT | Performed by: NURSE PRACTITIONER

## 2024-10-08 PROCEDURE — 90656 IIV3 VACC NO PRSV 0.5 ML IM: CPT | Performed by: NURSE PRACTITIONER

## 2024-10-08 PROCEDURE — 99214 OFFICE O/P EST MOD 30 MIN: CPT | Performed by: NURSE PRACTITIONER

## 2024-10-08 PROCEDURE — 3008F BODY MASS INDEX DOCD: CPT | Performed by: NURSE PRACTITIONER

## 2024-10-08 PROCEDURE — 3077F SYST BP >= 140 MM HG: CPT | Performed by: NURSE PRACTITIONER

## 2024-10-08 PROCEDURE — 90471 IMMUNIZATION ADMIN: CPT | Performed by: NURSE PRACTITIONER

## 2024-10-08 RX ORDER — HYDROCHLOROTHIAZIDE 25 MG/1
25 TABLET ORAL DAILY
Qty: 90 TABLET | Refills: 1 | Status: SHIPPED | OUTPATIENT
Start: 2024-10-08 | End: 2025-04-06

## 2024-10-08 RX ORDER — ATORVASTATIN CALCIUM 40 MG/1
40 TABLET, FILM COATED ORAL NIGHTLY
Qty: 90 TABLET | Refills: 1 | Status: SHIPPED | OUTPATIENT
Start: 2024-10-08 | End: 2025-04-06

## 2024-10-08 RX ORDER — SERTRALINE HYDROCHLORIDE 100 MG/1
200 TABLET, FILM COATED ORAL DAILY
Qty: 180 TABLET | Refills: 1 | Status: SHIPPED | OUTPATIENT
Start: 2024-10-08 | End: 2025-04-06

## 2024-10-08 ASSESSMENT — ENCOUNTER SYMPTOMS
DIAPHORESIS: 0
VOMITING: 0
FEVER: 0
CONSTIPATION: 0
PALPITATIONS: 0
NUMBNESS: 0
SHORTNESS OF BREATH: 0
DIZZINESS: 0
ALLERGIC/IMMUNOLOGIC NEGATIVE: 1
COUGH: 0
NAUSEA: 0
DIARRHEA: 0
PSYCHIATRIC NEGATIVE: 1
WHEEZING: 0
FATIGUE: 0
HEMATOLOGIC/LYMPHATIC NEGATIVE: 1
WEAKNESS: 0
MUSCULOSKELETAL NEGATIVE: 1
LIGHT-HEADEDNESS: 0

## 2024-10-08 ASSESSMENT — PATIENT HEALTH QUESTIONNAIRE - PHQ9
1. LITTLE INTEREST OR PLEASURE IN DOING THINGS: NOT AT ALL
2. FEELING DOWN, DEPRESSED OR HOPELESS: NOT AT ALL
SUM OF ALL RESPONSES TO PHQ9 QUESTIONS 1 AND 2: 0

## 2024-10-08 ASSESSMENT — PAIN SCALES - GENERAL: PAINLEVEL: 4

## 2024-10-08 NOTE — PROGRESS NOTES
"Chief Complaint  Krishna Pressley is a 58 y.o. male presenting for \"Follow-up (6 month follow up/Flu shot).\"    HPI     Krishna Pressley is a 58 y.o. male presenting for his six month follow up and flu shot, no concerns.      HTN well contorlled on meds.      Anxiety well controlled on meds     Due for labs       Past Medical History  Patient Active Problem List    Diagnosis Date Noted    Recurrent ventral hernia 04/10/2024    Sleep apnea 04/09/2024    Ankylosing spondylitis 04/09/2024    Cervical radiculopathy 04/09/2024    Diastasis recti 04/09/2024    Disorder 04/09/2024    Knee pain 04/09/2024    Neck pain 04/09/2024    Pain in other specified joint 04/09/2024    Periodic limb movement disorder 04/09/2024    Post covid-19 condition, unspecified 04/09/2024    Seasonal allergies 04/09/2024    Situational anxiety 09/20/2023    Attention disturbance 09/20/2023    Impairment of balance 09/20/2023    Intermittent claudication (CMS-HCC) 09/20/2023    Depressive disorder 09/20/2023    Gastrocnemius muscle strain, left, subsequent encounter 09/20/2023    Hamstring muscle strain, left, subsequent encounter 09/20/2023    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    Disorder of intervertebral disc 09/20/2023    Mixed hyperlipidemia 09/20/2023    Pre-syncope 09/20/2023    Primary insomnia 09/20/2023    Tremor 09/20/2023    Obstructive sleep apnea syndrome 09/20/2023    Sprain of medial collateral ligament of right knee 09/20/2023    Tear of medial meniscus of knee 09/20/2023    Tremor of both hands 09/20/2023    Vitamin D deficiency 09/20/2023    Condition not found 09/20/2023    Gouty arthritis of toe 09/20/2023    Primary hypertension 11/03/2022    Diarrhea, unspecified 07/11/2022    Impaired concentration 06/30/2022    Excessive sweating 03/23/2022    Abnormal gait 03/23/2022    Recurrent falls 03/23/2022    Cough 12/29/2021    Closed fracture of upper end of tibia 03/09/2006    "     Medications  Current Outpatient Medications   Medication Instructions    atorvastatin (LIPITOR) 40 mg, oral, Nightly    fluticasone (Flonase) 50 mcg/actuation nasal spray 1 spray, Each Nostril, Daily    hydroCHLOROthiazide (HYDRODIURIL) 25 mg, oral, Daily    meloxicam (MOBIC) 15 mg, oral, Daily    multivitamin (Multiple Vitamins) tablet 1 tablet, oral, Daily    sertraline (ZOLOFT) 200 mg, oral, Daily        Surgical History  He has a past surgical history that includes Vasectomy (02/11/2014).     Social History  He reports that he has quit smoking. His smoking use included cigarettes. He has been exposed to tobacco smoke. He has quit using smokeless tobacco. He reports that he does not currently use alcohol. He reports that he does not use drugs.    Family History  Family History   Problem Relation Name Age of Onset    Other (non hodgkins lymphoma) Mother      Hypertension Mother      Cancer Father      Hypertension Father          Allergies  Other, Meperidine, Oxycodone-acetaminophen, and Pentazocine    ROS  Review of Systems   Constitutional:  Negative for diaphoresis, fatigue and fever.   HENT: Negative.     Respiratory:  Negative for cough, shortness of breath and wheezing.    Cardiovascular:  Negative for chest pain and palpitations.   Gastrointestinal:  Negative for constipation, diarrhea, nausea and vomiting.   Genitourinary: Negative.    Musculoskeletal: Negative.    Skin: Negative.    Allergic/Immunologic: Negative.    Neurological:  Negative for dizziness, weakness, light-headedness and numbness.   Hematological: Negative.    Psychiatric/Behavioral: Negative.          Last Recorded Vitals  /84 (BP Location: Right arm, Patient Position: Sitting, BP Cuff Size: Adult)   Pulse 62   Temp 35.8 °C (96.5 °F)   Resp 18   Wt 104 kg (229 lb)   SpO2 97%     Physical Exam  Vitals and nursing note reviewed.   Constitutional:       Appearance: Normal appearance.   Neck:      Thyroid: No thyromegaly.    Cardiovascular:      Rate and Rhythm: Normal rate and regular rhythm.      Pulses: Normal pulses.      Heart sounds: Normal heart sounds, S1 normal and S2 normal.   Pulmonary:      Effort: Pulmonary effort is normal.      Breath sounds: Normal breath sounds.   Musculoskeletal:         General: Normal range of motion.      Cervical back: Normal range of motion.   Lymphadenopathy:      Cervical: No cervical adenopathy.   Skin:     General: Skin is warm.         Relevant Results      Assessment/Plan   Krishna was seen today for follow-up.  Diagnoses and all orders for this visit:  Essential hypertension (Primary)  -     atorvastatin (Lipitor) 40 mg tablet; Take 1 tablet (40 mg) by mouth once daily at bedtime.  -     hydroCHLOROthiazide (HYDRODiuril) 25 mg tablet; Take 1 tablet (25 mg) by mouth once daily.  Situational anxiety  -     sertraline (Zoloft) 100 mg tablet; Take 2 tablets (200 mg) by mouth once daily.  Mixed hyperlipidemia  -     Comprehensive Metabolic Panel; Future  Ankylosing spondylitis, unspecified site of spine (Multi)  -     Lipid Panel; Future  Encounter for immunization  -     Flu vaccine, trivalent, preservative free, age 6 months and greater (Fluraix/Fluzone/Flulaval)          COUNSELING      Medication education:              Education:  The patient is counseled regarding potential side-effects of any and all new medications             Understanding:  Patient expressed understanding             Adherence:  No barriers to adherence identified        Ashley Bahena, APRN-CNP

## 2024-11-05 DIAGNOSIS — Z76.0 MEDICATION REFILL: ICD-10-CM

## 2024-11-05 RX ORDER — MELOXICAM 15 MG/1
15 TABLET ORAL DAILY
Qty: 90 TABLET | Refills: 1 | Status: SHIPPED | OUTPATIENT
Start: 2024-11-05

## 2024-11-05 NOTE — TELEPHONE ENCOUNTER
Prescription request received and populated   Pharmacy populated  Last Office Visit: 10/08/24 for 6 month follow up  Has upcoming appmt 4/08/25 for physical

## 2024-11-07 ENCOUNTER — TELEPHONE (OUTPATIENT)
Dept: SLEEP MEDICINE | Facility: CLINIC | Age: 59
End: 2024-11-07

## 2025-02-19 ENCOUNTER — OFFICE VISIT (OUTPATIENT)
Dept: PRIMARY CARE | Facility: CLINIC | Age: 60
End: 2025-02-19
Payer: COMMERCIAL

## 2025-02-19 VITALS
HEART RATE: 98 BPM | DIASTOLIC BLOOD PRESSURE: 94 MMHG | OXYGEN SATURATION: 94 % | SYSTOLIC BLOOD PRESSURE: 148 MMHG | BODY MASS INDEX: 34.25 KG/M2 | WEIGHT: 226 LBS | TEMPERATURE: 98.1 F | HEIGHT: 68 IN | RESPIRATION RATE: 18 BRPM

## 2025-02-19 DIAGNOSIS — M10.072 ACUTE IDIOPATHIC GOUT OF LEFT FOOT: Primary | ICD-10-CM

## 2025-02-19 DIAGNOSIS — M45.9 ANKYLOSING SPONDYLITIS, UNSPECIFIED SITE OF SPINE (MULTI): ICD-10-CM

## 2025-02-19 PROBLEM — R25.1 TREMOR: Status: RESOLVED | Noted: 2023-09-20 | Resolved: 2025-02-19

## 2025-02-19 PROBLEM — M54.2 NECK PAIN: Status: RESOLVED | Noted: 2024-04-09 | Resolved: 2025-02-19

## 2025-02-19 PROBLEM — S83.411A SPRAIN OF MEDIAL COLLATERAL LIGAMENT OF RIGHT KNEE: Status: RESOLVED | Noted: 2023-09-20 | Resolved: 2025-02-19

## 2025-02-19 PROBLEM — R69 DISORDER: Status: RESOLVED | Noted: 2024-04-09 | Resolved: 2025-02-19

## 2025-02-19 PROBLEM — G47.30 SLEEP APNEA: Status: RESOLVED | Noted: 2024-04-09 | Resolved: 2025-02-19

## 2025-02-19 PROBLEM — M1A.0291: Status: RESOLVED | Noted: 2023-09-20 | Resolved: 2025-02-19

## 2025-02-19 PROBLEM — R55 PRE-SYNCOPE: Status: RESOLVED | Noted: 2023-09-20 | Resolved: 2025-02-19

## 2025-02-19 PROBLEM — M25.59 PAIN IN OTHER SPECIFIED JOINT: Status: RESOLVED | Noted: 2024-04-09 | Resolved: 2025-02-19

## 2025-02-19 PROBLEM — R26.9 ABNORMAL GAIT: Status: RESOLVED | Noted: 2022-03-23 | Resolved: 2025-02-19

## 2025-02-19 PROBLEM — R26.89 IMPAIRMENT OF BALANCE: Status: RESOLVED | Noted: 2023-09-20 | Resolved: 2025-02-19

## 2025-02-19 PROBLEM — S86.112D: Status: RESOLVED | Noted: 2023-09-20 | Resolved: 2025-02-19

## 2025-02-19 PROBLEM — S83.249A TEAR OF MEDIAL MENISCUS OF KNEE: Status: RESOLVED | Noted: 2023-09-20 | Resolved: 2025-02-19

## 2025-02-19 PROBLEM — S76.312D HAMSTRING MUSCLE STRAIN, LEFT, SUBSEQUENT ENCOUNTER: Status: RESOLVED | Noted: 2023-09-20 | Resolved: 2025-02-19

## 2025-02-19 PROBLEM — Z04.9 CONDITION NOT FOUND: Status: RESOLVED | Noted: 2023-09-20 | Resolved: 2025-02-19

## 2025-02-19 PROBLEM — M25.569 KNEE PAIN: Status: RESOLVED | Noted: 2024-04-09 | Resolved: 2025-02-19

## 2025-02-19 PROBLEM — R05.9 COUGH: Status: RESOLVED | Noted: 2021-12-29 | Resolved: 2025-02-19

## 2025-02-19 PROBLEM — U09.9 POST COVID-19 CONDITION, UNSPECIFIED: Status: RESOLVED | Noted: 2024-04-09 | Resolved: 2025-02-19

## 2025-02-19 PROBLEM — R29.6 RECURRENT FALLS: Status: RESOLVED | Noted: 2022-03-23 | Resolved: 2025-02-19

## 2025-02-19 PROCEDURE — 3080F DIAST BP >= 90 MM HG: CPT | Performed by: NURSE PRACTITIONER

## 2025-02-19 PROCEDURE — 3077F SYST BP >= 140 MM HG: CPT | Performed by: NURSE PRACTITIONER

## 2025-02-19 PROCEDURE — 3008F BODY MASS INDEX DOCD: CPT | Performed by: NURSE PRACTITIONER

## 2025-02-19 PROCEDURE — 1036F TOBACCO NON-USER: CPT | Performed by: NURSE PRACTITIONER

## 2025-02-19 PROCEDURE — 99214 OFFICE O/P EST MOD 30 MIN: CPT | Performed by: NURSE PRACTITIONER

## 2025-02-19 RX ORDER — PREDNISONE 20 MG/1
40 TABLET ORAL DAILY
Qty: 10 TABLET | Refills: 0 | Status: SHIPPED | OUTPATIENT
Start: 2025-02-19 | End: 2025-02-24

## 2025-02-19 ASSESSMENT — PAIN SCALES - GENERAL: PAINLEVEL_OUTOF10: 9

## 2025-02-19 ASSESSMENT — PATIENT HEALTH QUESTIONNAIRE - PHQ9
2. FEELING DOWN, DEPRESSED OR HOPELESS: NOT AT ALL
SUM OF ALL RESPONSES TO PHQ9 QUESTIONS 1 AND 2: 0
1. LITTLE INTEREST OR PLEASURE IN DOING THINGS: NOT AT ALL

## 2025-02-19 ASSESSMENT — ENCOUNTER SYMPTOMS
CONSTITUTIONAL NEGATIVE: 1
JOINT SWELLING: 1
CARDIOVASCULAR NEGATIVE: 1
RESPIRATORY NEGATIVE: 1

## 2025-02-19 NOTE — PROGRESS NOTES
"Chief Complaint  Krishna Pressley is a 59 y.o. male presenting for \"Foot Pain (Having pain in left foot, started on Thursday, pt having a hard time putting on work boots. States it is red and swollen. Takes meloxicam ).\"    HPI     Krishna Pressley is a 59 y.o. male presenting for red painful foot started thrusday, hard to put shoes on.  Takes meloxicam not helping.        Past Medical History  Patient Active Problem List    Diagnosis Date Noted    Recurrent ventral hernia 04/10/2024    Ankylosing spondylitis 04/09/2024    Cervical radiculopathy 04/09/2024    Diastasis recti 04/09/2024    Periodic limb movement disorder 04/09/2024    Seasonal allergies 04/09/2024    Situational anxiety 09/20/2023    Attention disturbance 09/20/2023    Intermittent claudication (CMS-formerly [facility]) 09/20/2023    Depressive disorder 09/20/2023    Disorder of intervertebral disc 09/20/2023    Mixed hyperlipidemia 09/20/2023    Primary insomnia 09/20/2023    Obstructive sleep apnea syndrome 09/20/2023    Tremor of both hands 09/20/2023    Vitamin D deficiency 09/20/2023    Gouty arthritis of toe 09/20/2023    Primary hypertension 11/03/2022    Diarrhea, unspecified 07/11/2022    Impaired concentration 06/30/2022    Excessive sweating 03/23/2022        Medications  Current Outpatient Medications   Medication Instructions    atorvastatin (LIPITOR) 40 mg, oral, Nightly    fluticasone (Flonase) 50 mcg/actuation nasal spray 1 spray, Each Nostril, Daily    hydroCHLOROthiazide (HYDRODIURIL) 25 mg, oral, Daily    meloxicam (MOBIC) 15 mg, oral, Daily    multivitamin (Multiple Vitamins) tablet 1 tablet, Daily    predniSONE (DELTASONE) 40 mg, oral, Daily        Surgical History  He has a past surgical history that includes Vasectomy (02/11/2014).     Social History  He reports that he has quit smoking. His smoking use included cigarettes. He has been exposed to tobacco smoke. He has quit using smokeless tobacco. He reports that he does not currently " use alcohol. He reports that he does not use drugs.    Family History  Family History   Problem Relation Name Age of Onset    Other (non hodgkins lymphoma) Mother      Hypertension Mother      Cancer Father      Hypertension Father          Allergies  Other, Meperidine, Oxycodone-acetaminophen, and Pentazocine    ROS  Review of Systems   Constitutional: Negative.    Respiratory: Negative.     Cardiovascular: Negative.    Musculoskeletal:  Positive for joint swelling.        Last Recorded Vitals  BP (!) 148/94 (BP Location: Right arm, Patient Position: Sitting, BP Cuff Size: Adult)   Pulse 98   Temp 36.7 °C (98.1 °F)   Resp 18   Wt 103 kg (226 lb)   SpO2 94%     Physical Exam  Vitals reviewed.   Constitutional:       Appearance: Normal appearance.   Cardiovascular:      Rate and Rhythm: Normal rate and regular rhythm.      Pulses: Normal pulses.      Heart sounds: Normal heart sounds.   Pulmonary:      Effort: Pulmonary effort is normal.      Breath sounds: Normal breath sounds.   Musculoskeletal:         General: Swelling and tenderness present.      Comments: Erythematous and painful    Neurological:      Mental Status: He is alert.         Relevant Results      Assessment/Plan   Krishna was seen today for foot pain.  Diagnoses and all orders for this visit:  Acute idiopathic gout of left foot (Primary)  -     predniSONE (Deltasone) 20 mg tablet; Take 2 tablets (40 mg) by mouth once daily for 5 days.  -     Uric Acid; Future  -     Uric Acid  Ankylosing spondylitis, unspecified site of spine (Multi)  Comments:  see Rheum          COUNSELING      Medication education:              Education:  The patient is counseled regarding potential side-effects of any and all new medications             Understanding:  Patient expressed understanding             Adherence:  No barriers to adherence identified        Ashley Bahena, APRN-CNP

## 2025-02-21 ENCOUNTER — TELEPHONE (OUTPATIENT)
Dept: PRIMARY CARE | Facility: CLINIC | Age: 60
End: 2025-02-21

## 2025-02-21 LAB — URATE SERPL-MCNC: 8.2 MG/DL (ref 4–8)

## 2025-03-06 NOTE — TELEPHONE ENCOUNTER
PT WAS SEEN 2 WEEKS AGO FOR GOUT STATES HE IS DOING EVERYTHING HE IS SUPPOSED NOW PAIN IS UP INTO HIS KNEE

## 2025-03-10 ENCOUNTER — TELEPHONE (OUTPATIENT)
Dept: PRIMARY CARE | Facility: CLINIC | Age: 60
End: 2025-03-10
Payer: COMMERCIAL

## 2025-03-10 DIAGNOSIS — M10.9 GOUTY ARTHRITIS OF TOE: Primary | ICD-10-CM

## 2025-03-10 NOTE — TELEPHONE ENCOUNTER
PT CALLING AGAIN STATING HIS GOUT IS NOT GETTING ANY BETTER STATES HE HAS BEEN FOLLOWING HIS DIET

## 2025-03-11 DIAGNOSIS — M10.9 GOUTY ARTHRITIS OF TOE: ICD-10-CM

## 2025-03-11 RX ORDER — ALLOPURINOL 100 MG/1
100 TABLET ORAL 2 TIMES DAILY
Qty: 180 TABLET | Refills: 0 | Status: SHIPPED | OUTPATIENT
Start: 2025-03-11

## 2025-03-11 RX ORDER — ALLOPURINOL 100 MG/1
100 TABLET ORAL 2 TIMES DAILY
Qty: 60 TABLET | Refills: 11 | Status: SHIPPED | OUTPATIENT
Start: 2025-03-11 | End: 2025-03-11

## 2025-03-11 NOTE — TELEPHONE ENCOUNTER
Sent him allopurinol to start twice a day this helps decrease uric acid levels in the body and therefore decrease gout attacks

## 2025-03-11 NOTE — TELEPHONE ENCOUNTER
PT CALLING STATING THIS IS HIS 4TH CALL TO OFFICE WOULD LIKE TO DISCUSS GOUT PROBLEMS AND KNEE PAIN

## 2025-04-08 ENCOUNTER — APPOINTMENT (OUTPATIENT)
Dept: PRIMARY CARE | Facility: CLINIC | Age: 60
End: 2025-04-08
Payer: COMMERCIAL